# Patient Record
(demographics unavailable — no encounter records)

---

## 2018-02-08 NOTE — EKG
Test Reason : 

Blood Pressure : ***/*** mmHG

Vent. Rate : 057 BPM     Atrial Rate : 057 BPM

   P-R Int : 142 ms          QRS Dur : 076 ms

    QT Int : 410 ms       P-R-T Axes : 051 004 010 degrees

   QTc Int : 399 ms

 

Sinus bradycardia

Cannot rule out Anterior infarct , age undetermined /Doubtful

Abnormal ECG

When compared with ECG of 30-SEP-2010 10:05,

Questionable change in QRS axis

Confirmed by HORTENSIA NOEL ([id number]) on 2/8/2018 9:55:53 PM

 

Referred By:  JEFF           Confirmed By:HORTENSIA NOEL

## 2018-02-09 NOTE — HP
HISTORY OF PRESENT ILLNESS:  Pastora Villalba is a 62-year-old female referred by Jessie Back, NP, f
or symptomatic cholelithiasis.  Ultrasound reveals gallstones with prominent bile duct 7 mm.  The pat
ient reports having had symptoms for several months, last episode in 01/2018, but nausea, bloating, a
nd gas pain subsequently.  She had pain radiating to her back and nausea.  She has had laboratories a
t [facility] 01/18/2018, normal CBC, liver function tests with bilirubin slightly 
elevated to 1.5.  The patient had Kae-en-Y gastric bypass laparoscopically 10 years ago.  Plan is fo
r laparoscopic cholecystectomy cholangiogram.  Risks of infection, bleeding, visceral and biliary inj
ury explained.  She consents.  Questions answered.

 

ALLERGIES:  None.

 

TOBACCO:  None.

 

ALCOHOL:  None.

 

PAST SURGICAL HISTORY:  Arthroscopy, both knees; foot surgery; tonsillectomy; laparoscopic Ake-en-Y 
gastric bypass 10 years ago with a preoperative weight of 210 pounds lowest weight of 87 pounds, curr
ently 146 pounds.  She has lost about 15 pounds recently due to her biliary symptoms.  About a year a
fter laparoscopic Kae-en-Y gastric bypass, she had a laparoscopic revision of gastrojejunostomy.  Sh
e regained weight after this.  She probably had a stricture. 

 

PAST MEDICAL HISTORY:  Colonoscopy in 2011, facial tics in childhood, herpest zoster[facility]; resolved after successful weight loss and better diet.

 

MEDICATIONS:  Atorvastatin, Pepcid, aspirin.  She states she takes her vitamins.

 

FAMILY HISTORY:  Father with coronary artery disease.  Mother with pancreatic cancer.

 

PHYSICAL EXAMINATION:

VITAL SIGNS:  146 pounds, 62 inches, 26 BMI, 108/59, 69, 99.5 degrees.

HEAD, EYES, EARS, NOSE, AND THROAT:  Unremarkable.

LUNGS:  Clear to auscultation.

CARDIAC:  Regular rate and rhythm without murmur or gallop.

ABDOMEN:  Soft, nontender, no masses.

EXTREMITIES:  Unremarkable.

 

ASSESSMENT AND PLAN:  Symptomatic cholelithiasis.  Recommend laparoscopic video cholecystectomy.  Ris
ks and benefits noted.  She consents.  Questions answered.

## 2018-02-14 NOTE — OP
DATE OF PROCEDURE:  02/14/2018

 

PREOPERATIVE DIAGNOSES:  Chronic cholecystitis and cholelithiasis, bile duct 8 mm on preoperative ult
rasound.  Bilirubin 1.5.

 

POSTOPERATIVE DIAGNOSES:  Chronic cholecystitis, cholelithiasis, choledocholithiasis.

 

PROCEDURE:  Laparoscopic video cholecystectomy.  Cholangiogram revealing distal common bile duct fill
ing defect stone without emptying of the duodenum.  Note, intraoperative consultation Dr. Gallegos who w
ill see her today and perform ERCP tomorrow.

 

SURGEON:  Hemal Foote M.D.

 

ANESTHESIA:  General.  Local 0.5% Marcaine, 30 mL, mixed with 2% Xylocaine, 10 mL

 

PROCEDURE IN DETAIL:  The patient was taken to the operating room where under general anesthesia, abd
omen was prepared with ChloraPrep, draped in routine fashion.  Local anesthetic infiltrated into skin
 and subcutaneous tissue about the operative sites.  Infraumbilical incision made and pneumoperitoneu
m to 15 mmHg obtained with the Veress needle, replacing it with a 5 port and video laparoscope insert
ed.  Right subxiphoid incision made and 11 port placed.  Right subcostal incision made mid clavicular
 anterior axillary lines and 5 ports placed.  Liver appeared to be normal.  Fundus of gallbladder gra
sped and reflected cephalad.  Infundibulum grasped and reflected laterally.  Cystic artery and duct d
issected free.  Critical view obtained with pericholecystic dissection 2/3 of the cystic plate and th
e cystic artery double clipped proximally.  Cystic duct singly clipped on the gallbladder side.  An o
pening made in the cystic duct and cholangiocath inserted and cholangiogram was obtained using fluoro
scopy revealing dilated left and right hepatic ducts, common bile duct, common hepatic duct with a di
stal filling defect without emptying into the duodenum.  Two cholangiograms were obtained after flush
ing it with saline without change.  Dr. Gallegos Gastroenterology was consulted and called as well as thony barreto's brother to inform them.  Dr. Gallegos will see the patient later today and ERCP perform tomorrow
.  She will be kept overnight.  The patient's son was notified after the operation.  At this point, c
holangiocath removed.  Cystic duct triply clipped.  Cystic artery and duct divided.  The gallbladder 
dissected free from the liver bed obtaining good hemostasis prior to division of final peritoneal att
achments.  Gallbladder and contents removed and submitted to Pathology.  Good hemostasis ensured with
 the cautery.  Irrigant and pneumoperitoneum evacuated.  All instruments removed and all skin incisio
ns approximated with interrupted subdermal 4-0 Monocryl and DermaGlue applied.

## 2018-02-14 NOTE — RAD
INTRAOPERATIVE CHOLANGIOGRAM:

 

History: Cholecystitis. 

 

FINDINGS: 

Intraoperative fluoroscopy is provided for cholangiogram as performed by Dr. Foote. Two spot fluoros
copic images show dilatation of the biliary system with a meniscus sign at the central biliary duct s
uggestive of high grade obstruction. Contrast is not seen within the duodenum. 

 

Fluoro time _= 23 seconds. 

 

POS: [facility]